# Patient Record
Sex: FEMALE | Race: WHITE | Employment: OTHER | URBAN - METROPOLITAN AREA
[De-identification: names, ages, dates, MRNs, and addresses within clinical notes are randomized per-mention and may not be internally consistent; named-entity substitution may affect disease eponyms.]

---

## 2019-01-23 ENCOUNTER — APPOINTMENT (OUTPATIENT)
Dept: GENERAL RADIOLOGY | Age: 84
End: 2019-01-23
Payer: MEDICARE

## 2019-01-23 ENCOUNTER — APPOINTMENT (OUTPATIENT)
Dept: CT IMAGING | Age: 84
End: 2019-01-23
Payer: MEDICARE

## 2019-01-23 ENCOUNTER — HOSPITAL ENCOUNTER (EMERGENCY)
Age: 84
Discharge: HOME | End: 2019-01-23
Attending: EMERGENCY MEDICINE
Payer: MEDICARE

## 2019-01-23 VITALS
HEART RATE: 67 BPM | DIASTOLIC BLOOD PRESSURE: 84 MMHG | OXYGEN SATURATION: 95 % | TEMPERATURE: 98.4 F | RESPIRATION RATE: 16 BRPM | SYSTOLIC BLOOD PRESSURE: 190 MMHG
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
## 2019-01-23 LAB
ANION GAP SERPL CALCULATED.3IONS-SCNC: 12 MMOL/L (ref 3–16)
BASOPHILS ABSOLUTE: 0.1 K/UL (ref 0–0.2)
BASOPHILS RELATIVE PERCENT: 1.3 %
BILIRUBIN URINE: NEGATIVE
BLOOD, URINE: NEGATIVE
BUN BLDV-MCNC: 24 MG/DL (ref 7–20)
CALCIUM SERPL-MCNC: 9.6 MG/DL (ref 8.3–10.6)
CHLORIDE BLD-SCNC: 98 MMOL/L (ref 99–110)
CLARITY: CLEAR
CO2: 26 MMOL/L (ref 21–32)
COLOR: YELLOW
CREAT SERPL-MCNC: 1.1 MG/DL (ref 0.6–1.2)
EKG ATRIAL RATE: 67 BPM
EKG DIAGNOSIS: NORMAL
EKG P AXIS: 45 DEGREES
EKG P-R INTERVAL: 196 MS
EKG Q-T INTERVAL: 432 MS
EKG QRS DURATION: 90 MS
EKG QTC CALCULATION (BAZETT): 456 MS
EKG R AXIS: -62 DEGREES
EKG T AXIS: 55 DEGREES
EKG VENTRICULAR RATE: 67 BPM
EOSINOPHILS ABSOLUTE: 0.3 K/UL (ref 0–0.6)
EOSINOPHILS RELATIVE PERCENT: 2.7 %
GFR AFRICAN AMERICAN: 56
GFR NON-AFRICAN AMERICAN: 47
GLUCOSE BLD-MCNC: 105 MG/DL (ref 70–99)
GLUCOSE URINE: NEGATIVE MG/DL
HCT VFR BLD CALC: 35 % (ref 36–48)
HEMOGLOBIN: 11.5 G/DL (ref 12–16)
KETONES, URINE: NEGATIVE MG/DL
LEUKOCYTE ESTERASE, URINE: NEGATIVE
LYMPHOCYTES ABSOLUTE: 2.3 K/UL (ref 1–5.1)
LYMPHOCYTES RELATIVE PERCENT: 23.5 %
MCH RBC QN AUTO: 28.7 PG (ref 26–34)
MCHC RBC AUTO-ENTMCNC: 32.7 G/DL (ref 31–36)
MCV RBC AUTO: 87.8 FL (ref 80–100)
MICROSCOPIC EXAMINATION: NORMAL
MONOCYTES ABSOLUTE: 1.2 K/UL (ref 0–1.3)
MONOCYTES RELATIVE PERCENT: 12.6 %
NEUTROPHILS ABSOLUTE: 5.8 K/UL (ref 1.7–7.7)
NEUTROPHILS RELATIVE PERCENT: 59.9 %
NITRITE, URINE: NEGATIVE
PDW BLD-RTO: 14.1 % (ref 12.4–15.4)
PH UA: 5.5
PLATELET # BLD: 214 K/UL (ref 135–450)
PMV BLD AUTO: 8.5 FL (ref 5–10.5)
POTASSIUM SERPL-SCNC: 3.5 MMOL/L (ref 3.5–5.1)
PROTEIN UA: NEGATIVE MG/DL
RBC # BLD: 3.99 M/UL (ref 4–5.2)
SODIUM BLD-SCNC: 136 MMOL/L (ref 136–145)
SPECIFIC GRAVITY UA: 1.02
TROPONIN: <0.01 NG/ML
URINE TYPE: NORMAL
UROBILINOGEN, URINE: 0.2 E.U./DL
WBC # BLD: 9.6 K/UL (ref 4–11)

## 2019-01-23 PROCEDURE — 85025 COMPLETE CBC W/AUTO DIFF WBC: CPT

## 2019-01-23 PROCEDURE — 84484 ASSAY OF TROPONIN QUANT: CPT

## 2019-01-23 PROCEDURE — 93005 ELECTROCARDIOGRAM TRACING: CPT | Performed by: EMERGENCY MEDICINE

## 2019-01-23 PROCEDURE — 36415 COLL VENOUS BLD VENIPUNCTURE: CPT

## 2019-01-23 PROCEDURE — 70450 CT HEAD/BRAIN W/O DYE: CPT

## 2019-01-23 PROCEDURE — 72125 CT NECK SPINE W/O DYE: CPT

## 2019-01-23 PROCEDURE — 96360 HYDRATION IV INFUSION INIT: CPT

## 2019-01-23 PROCEDURE — 99285 EMERGENCY DEPT VISIT HI MDM: CPT

## 2019-01-23 PROCEDURE — 73502 X-RAY EXAM HIP UNI 2-3 VIEWS: CPT

## 2019-01-23 PROCEDURE — 80048 BASIC METABOLIC PNL TOTAL CA: CPT

## 2019-01-23 PROCEDURE — 81003 URINALYSIS AUTO W/O SCOPE: CPT

## 2019-01-23 PROCEDURE — 71045 X-RAY EXAM CHEST 1 VIEW: CPT

## 2019-01-23 PROCEDURE — 2580000003 HC RX 258: Performed by: EMERGENCY MEDICINE

## 2019-01-23 RX ORDER — 0.9 % SODIUM CHLORIDE 0.9 %
500 INTRAVENOUS SOLUTION INTRAVENOUS ONCE
Status: COMPLETED | OUTPATIENT
Start: 2019-01-23 | End: 2019-01-23

## 2019-01-23 RX ADMIN — SODIUM CHLORIDE 500 ML: 9 INJECTION, SOLUTION INTRAVENOUS at 02:38

## 2019-02-12 ENCOUNTER — HOSPITAL ENCOUNTER (EMERGENCY)
Age: 84
Discharge: HOME | End: 2019-02-12
Attending: EMERGENCY MEDICINE
Payer: MEDICARE

## 2019-02-12 ENCOUNTER — APPOINTMENT (OUTPATIENT)
Dept: CT IMAGING | Age: 84
End: 2019-02-12
Payer: MEDICARE

## 2019-02-12 VITALS
TEMPERATURE: 98.2 F | RESPIRATION RATE: 12 BRPM | DIASTOLIC BLOOD PRESSURE: 80 MMHG | HEART RATE: 54 BPM | SYSTOLIC BLOOD PRESSURE: 200 MMHG | WEIGHT: 141 LBS | OXYGEN SATURATION: 94 %
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
## 2019-02-12 DIAGNOSIS — S00.83XA CONTUSION OF FACE, INITIAL ENCOUNTER: ICD-10-CM

## 2019-02-12 DIAGNOSIS — S09.90XA INJURY OF HEAD, INITIAL ENCOUNTER: ICD-10-CM

## 2019-02-12 PROCEDURE — 70450 CT HEAD/BRAIN W/O DYE: CPT

## 2019-02-12 PROCEDURE — 99284 EMERGENCY DEPT VISIT MOD MDM: CPT

## 2019-02-12 PROCEDURE — 72125 CT NECK SPINE W/O DYE: CPT

## 2019-02-12 RX ORDER — ACETAMINOPHEN 650 MG/1
650 SUPPOSITORY RECTAL EVERY 6 HOURS PRN
COMMUNITY
End: 2019-05-30 | Stop reason: CLARIF

## 2019-02-12 RX ORDER — POTASSIUM CHLORIDE 750 MG/1
10 TABLET, EXTENDED RELEASE ORAL DAILY
COMMUNITY
End: 2019-05-30 | Stop reason: CLARIF

## 2019-02-12 RX ORDER — DONEPEZIL HYDROCHLORIDE 10 MG/1
10 TABLET, FILM COATED ORAL
COMMUNITY

## 2019-02-12 RX ORDER — B-COMPLEX WITH VITAMIN C
1 TABLET ORAL DAILY
COMMUNITY
End: 2019-05-30 | Stop reason: CLARIF

## 2019-02-12 RX ORDER — ESCITALOPRAM OXALATE 10 MG/1
10 TABLET ORAL DAILY
COMMUNITY

## 2019-02-12 RX ORDER — METOPROLOL SUCCINATE 25 MG/1
25 TABLET, EXTENDED RELEASE ORAL DAILY
COMMUNITY

## 2019-02-12 RX ORDER — IBUPROFEN 400 MG/1
400 TABLET ORAL 2 TIMES DAILY WITH MEALS
COMMUNITY

## 2019-02-12 RX ORDER — ASPIRIN 81 MG/1
81 TABLET, CHEWABLE ORAL DAILY
COMMUNITY

## 2019-02-12 RX ORDER — SENNA PLUS 8.6 MG/1
1 TABLET ORAL DAILY
COMMUNITY

## 2019-02-12 RX ORDER — LOVASTATIN 20 MG/1
20 TABLET ORAL
COMMUNITY
End: 2019-05-30 | Stop reason: CLARIF

## 2019-02-12 RX ORDER — OMEPRAZOLE 20 MG/1
20 CAPSULE, DELAYED RELEASE ORAL DAILY
COMMUNITY

## 2019-02-12 ASSESSMENT — ENCOUNTER SYMPTOMS
BACK PAIN: 0
SHORTNESS OF BREATH: 0
ABDOMINAL PAIN: 0
FACIAL SWELLING: 1

## 2019-02-12 ASSESSMENT — PAIN DESCRIPTION - LOCATION: LOCATION: EYE

## 2019-02-12 ASSESSMENT — PAIN SCALES - WONG BAKER: WONGBAKER_NUMERICALRESPONSE: 2

## 2019-02-12 ASSESSMENT — PAIN DESCRIPTION - ORIENTATION: ORIENTATION: LEFT

## 2019-05-30 ENCOUNTER — APPOINTMENT (OUTPATIENT)
Dept: CT IMAGING | Age: 84
End: 2019-05-30
Payer: MEDICARE

## 2019-05-30 ENCOUNTER — APPOINTMENT (OUTPATIENT)
Dept: GENERAL RADIOLOGY | Age: 84
End: 2019-05-30
Payer: MEDICARE

## 2019-05-30 ENCOUNTER — HOSPITAL ENCOUNTER (EMERGENCY)
Age: 84
Discharge: HOME | End: 2019-05-30
Attending: EMERGENCY MEDICINE
Payer: MEDICARE

## 2019-05-30 VITALS
SYSTOLIC BLOOD PRESSURE: 196 MMHG | DIASTOLIC BLOOD PRESSURE: 94 MMHG | TEMPERATURE: 97.6 F | WEIGHT: 144 LBS | HEIGHT: 63 IN | HEART RATE: 67 BPM | BODY MASS INDEX: 25.52 KG/M2 | RESPIRATION RATE: 23 BRPM | OXYGEN SATURATION: 98 %

## 2019-05-30 DIAGNOSIS — I63.512 ACUTE ISCHEMIC LEFT MCA STROKE (HCC): Primary | ICD-10-CM

## 2019-05-30 LAB
ANION GAP SERPL CALCULATED.3IONS-SCNC: 13 MMOL/L (ref 3–16)
APTT: 22.6 SEC (ref 26–36)
BASE EXCESS VENOUS: 0 (ref -3–3)
BASOPHILS ABSOLUTE: 0 K/UL (ref 0–0.2)
BASOPHILS RELATIVE PERCENT: 0.5 %
BILIRUBIN URINE: NEGATIVE
BLOOD, URINE: NEGATIVE
BUN BLDV-MCNC: 24 MG/DL (ref 7–20)
CALCIUM IONIZED: 1.3 MMOL/L (ref 1.12–1.32)
CALCIUM SERPL-MCNC: 9.6 MG/DL (ref 8.3–10.6)
CHLORIDE BLD-SCNC: 101 MMOL/L (ref 99–110)
CLARITY: CLEAR
CO2: 24 MMOL/L (ref 21–32)
CO2: 27 MMOL/L (ref 21–32)
COLOR: YELLOW
CREAT SERPL-MCNC: 1 MG/DL (ref 0.6–1.2)
EKG ATRIAL RATE: 68 BPM
EKG DIAGNOSIS: NORMAL
EKG P AXIS: 95 DEGREES
EKG P-R INTERVAL: 204 MS
EKG Q-T INTERVAL: 442 MS
EKG QRS DURATION: 100 MS
EKG QTC CALCULATION (BAZETT): 469 MS
EKG R AXIS: -52 DEGREES
EKG T AXIS: 22 DEGREES
EKG VENTRICULAR RATE: 68 BPM
EOSINOPHILS ABSOLUTE: 0.3 K/UL (ref 0–0.6)
EOSINOPHILS RELATIVE PERCENT: 2.8 %
GFR AFRICAN AMERICAN: 56
GFR AFRICAN AMERICAN: >60
GFR NON-AFRICAN AMERICAN: 47
GFR NON-AFRICAN AMERICAN: 52
GLUCOSE BLD-MCNC: 109 MG/DL (ref 70–99)
GLUCOSE BLD-MCNC: 111 MG/DL (ref 70–99)
GLUCOSE BLD-MCNC: 112 MG/DL (ref 70–99)
GLUCOSE URINE: NEGATIVE MG/DL
HCO3 VENOUS: 25.5 MMOL/L (ref 23–29)
HCT VFR BLD CALC: 32.8 % (ref 36–48)
HEMOGLOBIN: 10.8 G/DL (ref 12–16)
INR BLD: 1.1 (ref 0.86–1.14)
KETONES, URINE: NEGATIVE MG/DL
LACTATE: 1.14 MMOL/L (ref 0.4–2)
LEUKOCYTE ESTERASE, URINE: NEGATIVE
LYMPHOCYTES ABSOLUTE: 1.5 K/UL (ref 1–5.1)
LYMPHOCYTES RELATIVE PERCENT: 16.8 %
MCH RBC QN AUTO: 28.3 PG (ref 26–34)
MCHC RBC AUTO-ENTMCNC: 33 G/DL (ref 31–36)
MCV RBC AUTO: 85.6 FL (ref 80–100)
MICROSCOPIC EXAMINATION: NORMAL
MONOCYTES ABSOLUTE: 0.9 K/UL (ref 0–1.3)
MONOCYTES RELATIVE PERCENT: 10.5 %
NEUTROPHILS ABSOLUTE: 6.1 K/UL (ref 1.7–7.7)
NEUTROPHILS RELATIVE PERCENT: 69.4 %
NITRITE, URINE: NEGATIVE
O2 SAT, VEN: 67 %
PCO2, VEN: 42.8 MM HG (ref 40–50)
PDW BLD-RTO: 15 % (ref 12.4–15.4)
PERFORMED ON: ABNORMAL
PERFORMED ON: ABNORMAL
PERFORMED ON: NORMAL
PERFORMED ON: NORMAL
PH UA: 6 (ref 5–8)
PH VENOUS: 7.38 (ref 7.35–7.45)
PLATELET # BLD: 256 K/UL (ref 135–450)
PMV BLD AUTO: 8.6 FL (ref 5–10.5)
PO2, VEN: 36 MM HG
POC ANION GAP: 8 (ref 10–20)
POC BUN: 23 MG/DL (ref 7–18)
POC CHLORIDE: 103 MMOL/L (ref 99–110)
POC CREATININE: 1.1 MG/DL (ref 0.6–1.2)
POC POTASSIUM: 3.7 MMOL/L (ref 3.5–5.1)
POC SAMPLE TYPE: ABNORMAL
POC SAMPLE TYPE: NORMAL
POC SAMPLE TYPE: NORMAL
POC SODIUM: 138 MMOL/L (ref 136–145)
POC TROPONIN I: 0.01 NG/ML (ref 0–0.1)
POTASSIUM REFLEX MAGNESIUM: 4.1 MMOL/L (ref 3.5–5.1)
PROTEIN UA: NEGATIVE MG/DL
PROTHROMBIN TIME: 12.5 SEC (ref 9.8–13)
RBC # BLD: 3.83 M/UL (ref 4–5.2)
SODIUM BLD-SCNC: 138 MMOL/L (ref 136–145)
SPECIFIC GRAVITY UA: 1.01 (ref 1–1.03)
TCO2 CALC VENOUS: 27 MMOL/L
TROPONIN: <0.01 NG/ML
URINE REFLEX TO CULTURE: NORMAL
URINE TYPE: NORMAL
UROBILINOGEN, URINE: 0.2 E.U./DL
WBC # BLD: 8.9 K/UL (ref 4–11)

## 2019-05-30 PROCEDURE — 82803 BLOOD GASES ANY COMBINATION: CPT

## 2019-05-30 PROCEDURE — 83605 ASSAY OF LACTIC ACID: CPT

## 2019-05-30 PROCEDURE — 70450 CT HEAD/BRAIN W/O DYE: CPT

## 2019-05-30 PROCEDURE — 70496 CT ANGIOGRAPHY HEAD: CPT

## 2019-05-30 PROCEDURE — 96374 THER/PROPH/DIAG INJ IV PUSH: CPT

## 2019-05-30 PROCEDURE — 6360000004 HC RX CONTRAST MEDICATION: Performed by: EMERGENCY MEDICINE

## 2019-05-30 PROCEDURE — 71045 X-RAY EXAM CHEST 1 VIEW: CPT

## 2019-05-30 PROCEDURE — 93005 ELECTROCARDIOGRAM TRACING: CPT | Performed by: EMERGENCY MEDICINE

## 2019-05-30 PROCEDURE — 85025 COMPLETE CBC W/AUTO DIFF WBC: CPT

## 2019-05-30 PROCEDURE — 81003 URINALYSIS AUTO W/O SCOPE: CPT

## 2019-05-30 PROCEDURE — 70498 CT ANGIOGRAPHY NECK: CPT

## 2019-05-30 PROCEDURE — 6370000000 HC RX 637 (ALT 250 FOR IP): Performed by: EMERGENCY MEDICINE

## 2019-05-30 PROCEDURE — 84484 ASSAY OF TROPONIN QUANT: CPT

## 2019-05-30 PROCEDURE — 80047 BASIC METABLC PNL IONIZED CA: CPT

## 2019-05-30 PROCEDURE — 6360000002 HC RX W HCPCS: Performed by: EMERGENCY MEDICINE

## 2019-05-30 PROCEDURE — 85730 THROMBOPLASTIN TIME PARTIAL: CPT

## 2019-05-30 PROCEDURE — 99285 EMERGENCY DEPT VISIT HI MDM: CPT

## 2019-05-30 PROCEDURE — 85610 PROTHROMBIN TIME: CPT

## 2019-05-30 RX ORDER — AMLODIPINE BESYLATE 5 MG/1
5 TABLET ORAL DAILY
COMMUNITY

## 2019-05-30 RX ORDER — ACETAMINOPHEN 650 MG/1
650 SUPPOSITORY RECTAL EVERY 6 HOURS PRN
COMMUNITY

## 2019-05-30 RX ORDER — ATORVASTATIN CALCIUM 10 MG/1
10 TABLET, FILM COATED ORAL DAILY
Qty: 30 TABLET | Refills: 5 | DISCHARGE
Start: 2019-05-30

## 2019-05-30 RX ORDER — ATORVASTATIN CALCIUM 10 MG/1
10 TABLET, FILM COATED ORAL DAILY
Qty: 90 TABLET | Refills: 0 | Status: SHIPPED | OUTPATIENT
Start: 2019-05-30

## 2019-05-30 RX ORDER — ACETAMINOPHEN 325 MG/1
650 TABLET ORAL ONCE
Status: DISCONTINUED | OUTPATIENT
Start: 2019-05-30 | End: 2019-05-30 | Stop reason: HOSPADM

## 2019-05-30 RX ORDER — ACETAMINOPHEN 325 MG/1
650 TABLET ORAL EVERY 6 HOURS PRN
COMMUNITY

## 2019-05-30 RX ORDER — KETOROLAC TROMETHAMINE 30 MG/ML
15 INJECTION, SOLUTION INTRAMUSCULAR; INTRAVENOUS ONCE
Status: COMPLETED | OUTPATIENT
Start: 2019-05-30 | End: 2019-05-30

## 2019-05-30 RX ORDER — SIMVASTATIN 20 MG
20 TABLET ORAL NIGHTLY
Qty: 30 TABLET | Refills: 5 | DISCHARGE
Start: 2019-05-30 | End: 2019-05-30 | Stop reason: HOSPADM

## 2019-05-30 RX ORDER — RISPERIDONE 0.25 MG/1
0.25 TABLET, FILM COATED ORAL NIGHTLY
COMMUNITY

## 2019-05-30 RX ORDER — HYDROCORTISONE 0.5 %
CREAM (GRAM) TOPICAL 3 TIMES DAILY PRN
COMMUNITY

## 2019-05-30 RX ADMIN — KETOROLAC TROMETHAMINE 15 MG: 30 INJECTION, SOLUTION INTRAMUSCULAR at 14:11

## 2019-05-30 RX ADMIN — IOPAMIDOL 80 ML: 755 INJECTION, SOLUTION INTRAVENOUS at 11:39

## 2019-05-30 ASSESSMENT — PAIN SCALES - GENERAL
PAINLEVEL_OUTOF10: 6
PAINLEVEL_OUTOF10: 6

## 2019-05-30 NOTE — ED PROVIDER NOTES
[address]          EM RESIDENT NOTE       Date of evaluation: 5/30/2019    Chief Complaint     AMS    History of Present Illness     Sonu Pacheco is a 80 y.o. female who presents with concern for stroke. The patient's history is limited given her underlying dementia. She was last seen normal at approximately 8:30 this morning when she was found by a LPN to have right-sided weakness with some mild dysarthria. EMS was called. They noted her to be normal glycemic with very mild right-sided deficits. She does not have any reported history of stroke and is not currently on any blood thinning medications. Review of Systems     Review of Systems   Unable to perform ROS: Dementia       Past Medical, Surgical, Family, and Social History     She has a past medical history of Dementia, GERD (gastroesophageal reflux disease), Gout, Hyperlipidemia, and Hypertension. She has no past surgical history on file. Her family history is not on file. She reports that she has never smoked. She has never used smokeless tobacco. She reports that she does not drink alcohol or use drugs.     Medications     Previous Medications    ACETAMINOPHEN (TYLENOL) 325 MG TABLET    Take 650 mg by mouth every 6 hours as needed for Pain    ACETAMINOPHEN (TYLENOL) 650 MG SUPPOSITORY    Place 650 mg rectally every 6 hours as needed for Fever    AMLODIPINE (NORVASC) 5 MG TABLET    Take 5 mg by mouth daily    ASPIRIN 81 MG CHEWABLE TABLET    Take 81 mg by mouth daily    DONEPEZIL (ARICEPT) 10 MG TABLET    Take 10 mg by mouth Daily with supper    ESCITALOPRAM (LEXAPRO) 10 MG TABLET    Take 10 mg by mouth daily    IBUPROFEN (ADVIL;MOTRIN) 400 MG TABLET    Take 400 mg by mouth 2 times daily (with meals)    METHYL SALICYLATE-MENTHOL (SHUN BRIDGES GREASELESS) 10-15 % CREA    Apply topically 3 times daily as needed for Pain    METOPROLOL SUCCINATE (TOPROL XL) 25 MG EXTENDED RELEASE TABLET    Take 25 mg by mouth daily    OMEPRAZOLE (PRILOSEC) 20 MG DELAYED RELEASE CAPSULE    Take 20 mg by mouth daily    RISPERIDONE (RISPERDAL) 0.25 MG TABLET    Take 0.25 mg by mouth nightly    SENNA (SENOKOT) 8.6 MG TABLET    Take 1 tablet by mouth daily       Allergies     She is allergic to codeine. Physical Exam     INITIAL VITALS: BP: (!) 157/72, Temp: 97.6 °F (36.4 °C), Pulse: 62, Resp: 16, SpO2: 95 %   Physical Exam   Constitutional: She appears well-developed and well-nourished. No distress. Elderly female   HENT:   Head: Normocephalic and atraumatic. Eyes: Conjunctivae and EOM are normal.   Cardiovascular: Normal rate, regular rhythm, normal heart sounds and intact distal pulses. Exam reveals no gallop and no friction rub. No murmur heard. Pulmonary/Chest: Effort normal and breath sounds normal. No respiratory distress. She has no wheezes. She has no rales. Abdominal: Soft. Bowel sounds are normal. She exhibits no distension. There is no tenderness. There is no rebound and no guarding. Neurological: She is alert. Confused, baseline per family  Mild R facial droop  Mild RUE weakness  Mild dysarthria   Skin: Skin is warm and dry. Nursing note and vitals reviewed. NIH Stroke Scale      Interval: Baseline  Time: 12:52 PM  Person Administering Scale: Charlett Phalen      1a  Level of consciousness: 0=alert; keenly responsive   1b. LOC questions:  1=Performs one task correctly   1c. LOC commands: 0=Performs both tasks correctly   2. Best Gaze: 0=normal   3. Visual: 0=No visual loss   4. Facial Palsy: 1=Minor paralysis (flattened nasolabial fold, asymmetric on smiling)   5a. Motor left arm: 0=No drift, limb holds 90 (or 45) degrees for full 10 seconds   5b.   Motor right arm: 1=Drift, limb holds 90 (or 45) degrees but drifts down before full 10 seconds: does not hit bed   6a. motor left le=No drift, limb holds 90 (or 45) degrees for full 10 seconds   6b  Motor right le=No drift, limb holds 90 (or 45) degrees for full 10 seconds   7. Limb Ataxia: 0=Absent   8. Sensory: 0=Normal; no sensory loss   9. Best Language:  0=No aphasia, normal   10. Dysarthria: 1=Mild to moderate, patient slurs at least some words and at worst, can be understood with some difficulty   11. Extinction and Inattention: 0=No abnormality   12. Distal motor function: 0=Normal    Total:   4     DiagnosticResults     EKG   Interpreted in conjunction with emergencydepartment physician No att. providers found  Rhythm: normal sinus   Rate: normal  Axis: left  Ectopy: premature atrial contraction  Conduction: 1st degree AV block  ST Segments: no acute change  T Waves:no acute change  Q Waves: none  Clinical Impression: non-specific EKG  Comparison:  No changes compared to EKG obtained 1/23/19    RADIOLOGY:  XR CHEST PORTABLE   Final Result   1. No evidence of acute cardiopulmonary disease. CTA NECK W CONTRAST   Final Result      1. Normal variant anatomy, as above. No large vessel occlusion or significant stenosis. CTA HEAD W CONTRAST   Final Result      1. Normal variant anatomy, as above. No large vessel occlusion or significant stenosis. CT Head WO Contrast   Final Result      No acute intracranial abnormality. Large remote infarct of the left occipital lobe. Generalized atrophy and age-related microvascular ischemic changes.           LABS:   Results for orders placed or performed during the hospital encounter of 05/30/19   CBC Auto Differential   Result Value Ref Range    WBC 8.9 4.0 - 11.0 K/uL    RBC 3.83 (L) 4.00 - 5.20 M/uL    Hemoglobin 10.8 (L) 12.0 - 16.0 g/dL    Hematocrit 32.8 (L) 36.0 - 48.0 %    MCV 85.6 80.0 - 100.0 fL    MCH 28.3 26.0 - 34.0 pg    MCHC 33.0 31.0 - 36.0 g/dL    RDW 15.0 12.4 - 15.4 %    Platelets 118 677 - 243 K/uL    MPV 8.6 5.0 - 10.5 fL    Neutrophils % 69.4 %    Lymphocytes % 16.8 %    Monocytes % 10.5 %    Eosinophils % 2.8 %    Basophils % 0.5 %    Neutrophils # 6.1 1.7 - 7.7 K/uL Lymphocytes # 1.5 1.0 - 5.1 K/uL    Monocytes # 0.9 0.0 - 1.3 K/uL    Eosinophils # 0.3 0.0 - 0.6 K/uL    Basophils # 0.0 0.0 - 0.2 K/uL   Basic Metabolic Panel w/ Reflex to MG   Result Value Ref Range    Sodium 138 136 - 145 mmol/L    Potassium reflex Magnesium 4.1 3.5 - 5.1 mmol/L    Chloride 101 99 - 110 mmol/L    CO2 24 21 - 32 mmol/L    Anion Gap 13 3 - 16    Glucose 112 (H) 70 - 99 mg/dL    BUN 24 (H) 7 - 20 mg/dL    CREATININE 1.0 0.6 - 1.2 mg/dL    GFR Non-African American 52 (A) >60    GFR African American >60 >60    Calcium 9.6 8.3 - 10.6 mg/dL   Troponin   Result Value Ref Range    Troponin <0.01 <0.01 ng/mL   Protime-INR   Result Value Ref Range    Protime 12.5 9.8 - 13.0 sec    INR 1.10 0.86 - 1.14   APTT   Result Value Ref Range    aPTT 22.6 (L) 26.0 - 36.0 sec   POCT Venous   Result Value Ref Range    pH, Petra 7.383 7.350 - 7.450    pCO2, Petra 42.8 40.0 - 50.0 mm Hg    pO2, Petra 36 Not Established mm Hg    HCO3, Venous 25.5 23.0 - 29.0 mmol/L    Base Excess, Petra 0 -3 - 3    O2 Sat, Petra 67 Not Established %    TC02 (Calc), Petra 27 Not Established mmol/L    Lactate 1.14 0.40 - 2.00 mmol/L    Sample Type PETRA     Performed on SEE BELOW    POCT Venous   Result Value Ref Range    POC Sodium 138 136 - 145 mmol/L    POC Potassium 3.7 3.5 - 5.1 mmol/L    POC Chloride 103 99 - 110 mmol/L    CO2 27 21 - 32 mmol/L    POC Anion Gap 8 (L) 10 - 20    POC Glucose 111 (H) 70 - 99 mg/dl    POC BUN 23 (H) 7 - 18 mg/dL    POC Creatinine 1.1 0.6 - 1.2 mg/dL    GFR Non- 47 (A) >60    GFR African American 56 (A)     Calcium, Ion 1.30 1.12 - 1.32 mmol/L    Sample Type PETRA     Performed on SEE BELOW    POCT Glucose   Result Value Ref Range    POC Glucose 109 (H) 70 - 99 mg/dl    Performed on ACCU-CHEK    POCT Venous   Result Value Ref Range    POC Troponin I 0.01 0.00 - 0.10 ng/mL    Sample Type PETRA     Performed on SEE BELOW          RECENT VITALS:  BP: (!) 187/78, Temp: 97.6 °F (36.4 °C), Pulse: 73,Resp: 20, neurology is not recommending any significant stroke workup. Additionally, there are just recommending physical therapy and occupational therapy however, the patient is able to have these things performed an outpatient basis at her current facility. She also Speech evaluation performed there. Additionally, her family is concerned that she may become delirious in the hospital and so they are requesting her to go home. We talked about the risks and benefits of this decision and they ultimately decided to pursue outpatient PT, OT, and speech therapy at the patient's current facility. They're given strict return precautions to return to the emergency department. This patient was also evaluated by the attending physician. All care plans werediscussed and agreed upon. Clinical Impression     1. Acute ischemic left MCA stroke (HCC)        Disposition     PATIENT REFERRED TO:  No follow-up provider specified.     DISCHARGE MEDICATIONS:  New Prescriptions    No medications on file       DISPOSITION       Tabitha Garcia MD  05/30/19 [address] MD Vadim  05/30/19 2022

## 2019-05-30 NOTE — CARE COORDINATION
Pt lives in an [place] [address] apartment at [facility]. She is in the emergency department today with c/o possible CVA. Information was retrieved from her paper chart. Emergency Contacts:  Alessandro Mora (daughter-in-law) Listed as FIRST CONTACT because she is easiest to reach [address] (son/HCPOA) [phone] H  [phone] Dmitry Oscar son/HCPOA [phone] C    Pt has a signed [facility] form.     Alexi Saenz RN  Case Management  [phone]

## 2019-05-30 NOTE — ED NOTES
Attempted to give patient tylenol. Pt unable to swallow. Pt made NPO and MD notified.   Pt will need a speech eval.     Mauri Broderick RN  05/30/19 3755

## 2019-05-30 NOTE — ED TRIAGE NOTES
Pt's LKN was 0830 this morning at nursing home. LPN was with pt and states the pt started to slur and concerned for a stroke. Pt arrived around 1110 and taken straight to CT scan. Pt has right side weakness and slurred speech at this time. Pt has hx of dementia.

## 2019-05-30 NOTE — ED NOTES
Bed: 1TR-01  Expected date: 5/30/19  Expected time:   Means of arrival:   Comments:  Medic 2-LKW 8743 R side drift, slurred speech, weakness R side     Ana Cristina Stringer RN  05/30/19 5129

## 2019-05-30 NOTE — DISCHARGE INSTR - COC
Continuity of Care Form    Patient Name: Sonu Pacheco   :  1928  MRN:  [id number]    Admit date:  2019  Discharge date:  ***    Code Status Order: No Order   Advance Directives:     Admitting Physician:  No admitting provider for patient encounter. PCP: No primary care provider on file. Discharging Nurse: [facility] Unit/Room#: TRTR  Discharging Unit Phone Number: ***    Emergency Contact:   Extended Emergency Contact Information  Primary Emergency Contact: Wally Savage of [address] [place] [address] Phone: [phone]  Mobile Phone: [phone]  Relation: Child  Secondary Emergency Contact: Elder Iqbal  Address: [facility] [address] Phone: [phone]  Relation: Child    Past Surgical History:  History reviewed. No pertinent surgical history. Immunization History: There is no immunization history on file for this patient. Active Problems: There is no problem list on file for this patient.       Isolation/Infection:   Isolation          No Isolation            Nurse Assessment:  Last Vital Signs: BP (!) 181/85   Pulse 63   Temp 97.6 °F (36.4 °C) (Oral)   Resp 13   Ht 5' 3\" (1.6 m)   Wt 144 lb (65.3 kg)   SpO2 100%   BMI 25.51 kg/m²     Last documented pain score (0-10 scale): Pain Level: 6  Last Weight:   Wt Readings from Last 1 Encounters:   19 144 lb (65.3 kg)     Mental Status:  {IP PT MENTAL STATUS:15400}    IV Access:  { RADHA IV ACCESS:733221889}    Nursing Mobility/ADLs:  Walking   {CHP DME IJGU:801029399}  Transfer  {CHP DME SGF}  Bathing  {CHP DME IIOY:439731360}  Dressing  {CHP DME ITRD:836428113}  Toileting  {CHP DME IITO:096823652}  Feeding  {CHP DME FDTH:633739806}  Med Admin  {CHP DME FJDW:452085446}  Med Delivery   { RADHA MED Delivery:297697879}    Wound Care Documentation and Therapy:        Elimination:  Continence:   · Bowel: {YES / MM:01002}  · Bladder: {YES / YQ:14112}  Urinary Catheter: {Urinary Catheter:165520136}   Colostomy/Ileostomy/Ileal Conduit: {YES / UQ:08883}       Date of Last BM: ***  No intake or output data in the 24 hours ending 19 1408  No intake/output data recorded. Safety Concerns:     [employer] Safety Concerns:344363817}    Impairments/Disabilities:      [employer] Impairments/Disabilities:149103266}    Nutrition Therapy:  Current Nutrition Therapy:   [employer] Diet List:294748888}    Routes of Feeding: {CHP DME Other Feedings:603406149}  Liquids: {Slp liquid thickness:26044}  Daily Fluid Restriction: {CHP DME Yes amt example:618622663}  Last Modified Barium Swallow with Video (Video Swallowing Test): {Done Not Done BUDV:975651309}    Treatments at the Time of Hospital Discharge:   Respiratory Treatments: ***  Oxygen Therapy:  {Therapy; copd oxygen:57578}  Ventilator:    { CC Vent KAPM:638098702}    Rehab Therapies: {THERAPEUTIC INTERVENTION:1253005506}  Weight Bearing Status/Restrictions: [facility] Weight Bearin}  Other Medical Equipment (for information only, NOT a DME order):  {EQUIPMENT:776302222}  Other Treatments: ***    Patient's personal belongings (please select all that are sent with patient):  {[facility] DME Belongings:310980950}    RN SIGNATURE:  {Esignature:786377010}    CASE MANAGEMENT/SOCIAL WORK SECTION    Inpatient Status Date: ***    Readmission Risk Assessment Score:  Readmission Risk              Risk of Unplanned Readmission:        0           Discharging to Facility/ Agency   · Name:   · Address:  · Phone:  · Fax:    Dialysis Facility (if applicable)   · Name:  · Address:  · Dialysis Schedule:  · Phone:  · Fax:    / signature: {Esignature:042335215}    PHYSICIAN SECTION    Prognosis: Poor    Condition at Discharge: Stable    Rehab Potential (if transferring to Rehab): Poor    Recommended Labs or Other Treatments After Discharge:     BP control; permissive hypertension for 48 hours.  Then goal systolic BP less than 459  PT, OT, SLP eval and treat  Aspirin, statin daily    Physician Certification: I certify the above information and transfer of Brookneal Cousin  is necessary for the continuing treatment of the diagnosis listed and that she requires Intermediate Nursing Care for greater 30 days.      Update Admission H&P: No change in H&P    PHYSICIAN SIGNATURE:  Electronically signed by Dwaine Keyes MD on 5/30/19 at 2:09 PM

## 2019-05-30 NOTE — CONSULTS
Hospital Medicine  Consult History & Physical        Chief Complaint:  Acute CVA    Date of Service: Pt seen/examined in consultation on 5/30/2019     History Of Present Illness:      80 y.o. female who we are asked to see/evaluate by Earl Diallo MD for possible admission to hospital for evaluation of acute CVA. Pt was noted to have facial droop and right sided weakness this morning around 8:30 AM, she was brought to the ED after that. She has a prior history of stroke and fairly advanced dementia, she has 24/7 nursing care in an assisted living facility at baseline. She was seen by neurology and recommended no further work up. TPA was advised against, due to pt being out of therapeutic window, advanced age and prior stroke. Physical and occupational therapy as well as speech therapy are recommended per neurology. Code status discussed with family, they report pt is a DNR, and they would not want aggressive measures if pt were to decline    Past Medical History:        Diagnosis Date    Dementia     GERD (gastroesophageal reflux disease)     Gout     Hyperlipidemia     Hypertension        Past Surgical History:    History reviewed. No pertinent surgical history. Medications Prior to Admission:    Prior to Admission medications    Medication Sig Start Date End Date Taking?  Authorizing Provider   acetaminophen (TYLENOL) 650 MG suppository Place 650 mg rectally every 6 hours as needed for Fever   Yes Historical Provider, MD   acetaminophen (TYLENOL) 325 MG tablet Take 650 mg by mouth every 6 hours as needed for Pain   Yes Historical Provider, MD   amLODIPine (NORVASC) 5 MG tablet Take 5 mg by mouth daily   Yes Historical Provider, MD   risperiDONE (RISPERDAL) 0.25 MG tablet Take 0.25 mg by mouth nightly   Yes Historical Provider, MD   methyl salicylate-menthol (SHUN BRIDGES GREASELESS) 10-15 % CREA Apply topically 3 times daily as needed for Pain   Yes Historical Provider, MD   atorvastatin (LIPITOR) 10 MG tablet Take 1 tablet by mouth daily 5/30/19  Yes Dwaine Salvage, MD   aspirin 81 MG chewable tablet Take 81 mg by mouth daily   Yes Historical Provider, MD   donepezil (ARICEPT) 10 MG tablet Take 10 mg by mouth Daily with supper   Yes Historical Provider, MD   escitalopram (LEXAPRO) 10 MG tablet Take 10 mg by mouth daily   Yes Historical Provider, MD   ibuprofen (ADVIL;MOTRIN) 400 MG tablet Take 400 mg by mouth 2 times daily (with meals)   Yes Historical Provider, MD   metoprolol succinate (TOPROL XL) 25 MG extended release tablet Take 25 mg by mouth daily   Yes Historical Provider, MD   omeprazole (PRILOSEC) 20 MG delayed release capsule Take 20 mg by mouth daily   Yes Historical Provider, MD   senna (SENOKOT) 8.6 MG tablet Take 1 tablet by mouth daily   Yes Historical Provider, MD       Allergies:  Codeine    Social History:      The patient currently lives in an assisted living facility. TOBACCO:   reports that she has never smoked. She has never used smokeless tobacco.  ETOH:   reports that she does not drink alcohol. Family History:       History reviewed. No pertinent family history. REVIEW OF SYSTEMS:   Pertinent positives as noted in the HPI. All other systems reviewed and negative. PHYSICAL EXAM PERFORMED:  BP (!) 186/91   Pulse 62   Temp 97.6 °F (36.4 °C) (Oral)   Resp 16   Ht 5' 3\" (1.6 m)   Wt 144 lb (65.3 kg)   SpO2 98%   BMI 25.51 kg/m²      General appearance: No apparent distress, appears stated age, pleasantly demented  HEENT: Normal cephalic, atraumatic without obvious deformity. Pupils equal, round, and reactive to light. Extra ocular muscles intact. Conjunctivae/corneas clear. Neck: Supple, with full range of motion. No jugular venous distention. Trachea midline. Respiratory:  Normal respiratory effort. Clear to auscultation, bilaterally without Rales/Wheezes/Rhonchi.   Cardiovascular: Regular rate and rhythm with normal S1/S2 without murmurs, rubs or members agree that further work up is unlikely to be beneficial. Goals of therapy after stroke include physical, occupational and speech therapy, daily aspirin and statin  She is at risk for delirium if hospitalized  Additionally, goals of care are more focused on comfort measures. Therefore I believe pt may be appropriate to return to her nursing facility as long as PT, OT and speech therapy can be arranged. I have ordered atorvastatin and low dose aspirin on discharge  Allow for permissive hypertension for 48 hours, allow bp to autoregulate.  Long term goal in light of her advanced age and focus on comfort should be less than 359 systolic    Discussed with ED resident, family and case management    Diet: Diet NPO Effective Now  Code Status: No Order    PT/OT Eval Status: Active and ongoing    Dispo - ok to discharge    Thank you for the consultation, please call with questions    Vic Goldberg MD

## 2019-05-30 NOTE — CARE COORDINATION
Pt can return to [facility] today if arrangements for PT/OT/SN can be established. Called the facility and spoke with her nurse, Venkat Cano. He stated the facility uses [facility]. Pt already set up to receive PT/OT. Since she was sent out today, she will need ROS orders. Updated Dr. Freddy Saavedra. Orders received. Updated [facility] and faxed orders to [phone]. Discussed with family as well.     Thierno Owusu RN  Case Management  [phone]

## 2019-05-30 NOTE — ED PROVIDER NOTES
ED Attending Attestation Note     Date of evaluation: 5/30/2019    This patient was seen by the resident. I have seen and examined the patient, agree with the workup, evaluation, management and diagnosis. The care plan has been discussed. I have reviewed the ECG and concur with the resident's interpretation. My assessment reveals elderly female with concern for new onset stroke at 0830. Arrival around 11:10am.  Directly to CT scanner. Exam with slight dysarthria and a very slight RUE drift. No aphasia. Addendum: 12:08pm- given the patient's history of significant dementia and ultimately being just outside the 3 hour window and 80years old wanted to speak with the family about the risks and benefits of alteplase. Also wanted speak with the stroke team who recommended that with an old stroke on head CT, mild nondisabling deficits, her age, and her greater than 3 hour time window, she is not a good candidate for alteplase at this time. The family and other providers are in agreement with this. She'll be admitted for further management    Critical Care:  Due to the immediate potential for life-threatening deterioration due to stroke, I spent 35 minutes providing critical care. This time is excluding time spent performing procedures.   oLco Longo III, MD  05/30/19 9674

## 2019-05-30 NOTE — ED NOTES
Report called to [facility], RN     Tonia Bertrand, [facility]  05/30/19 1800

## 2019-05-30 NOTE — CONSULTS
Neurology consult Note    Dr. Serena Ellis requesting this consult. CC: Stroke    HPI:   Mr. Lorna Escamilla is a 80year old woman with a PMH of dementia, gERD, HLD, HTN who presented from her nursing home with right facial droop, mild right arm weakness, and slurred speech. She arrived at the ED with a LTKW of four hours ago. The stroke team was notified and it was decided to forego TPA due to her deficits, her age, and an old stroke on CT. Past Medical History:   Diagnosis Date    Dementia     GERD (gastroesophageal reflux disease)     Gout     Hyperlipidemia     Hypertension      History reviewed. No pertinent surgical history. CURRENT MEDICATIONS:  No current facility-administered medications for this encounter.      Current Outpatient Medications:     acetaminophen (TYLENOL) 650 MG suppository, Place 650 mg rectally every 6 hours as needed for Fever, Disp: , Rfl:     acetaminophen (TYLENOL) 325 MG tablet, Take 650 mg by mouth every 6 hours as needed for Pain, Disp: , Rfl:     amLODIPine (NORVASC) 5 MG tablet, Take 5 mg by mouth daily, Disp: , Rfl:     risperiDONE (RISPERDAL) 0.25 MG tablet, Take 0.25 mg by mouth nightly, Disp: , Rfl:     methyl salicylate-menthol (SHUN BRIDGES GREASELESS) 10-15 % CREA, Apply topically 3 times daily as needed for Pain, Disp: , Rfl:     aspirin 81 MG chewable tablet, Take 81 mg by mouth daily, Disp: , Rfl:     donepezil (ARICEPT) 10 MG tablet, Take 10 mg by mouth Daily with supper, Disp: , Rfl:     escitalopram (LEXAPRO) 10 MG tablet, Take 10 mg by mouth daily, Disp: , Rfl:     ibuprofen (ADVIL;MOTRIN) 400 MG tablet, Take 400 mg by mouth 2 times daily (with meals), Disp: , Rfl:     metoprolol succinate (TOPROL XL) 25 MG extended release tablet, Take 25 mg by mouth daily, Disp: , Rfl:     omeprazole (PRILOSEC) 20 MG delayed release capsule, Take 20 mg by mouth daily, Disp: , Rfl:     senna (SENOKOT) 8.6 MG tablet, Take 1 tablet by mouth daily, Disp: , Rfl:       ROS: to presenting at the end of the time window, her age, and her mild deficits, TPA was decided against.  I spoke extensively to family and as aggressive workup won't really change her management, we decided against it.      Plan:  -Continue ASA 81mg PO daily, can consider increasing to 325mg PO daily   -Start atorvastatin 10mg PO nightly  -Bedside swallow before PO   -PT/OT/ST  -Wouldn't get MRI or echo at this time as they won't        JOSEPHINE Finley-CNP  Neurology  [phone]

## 2019-05-30 NOTE — ED NOTES
Home Med List is currently In Progress. Called [facility] and spoke with RN Kristi Lemons. He states that he did send a medication list and DNR copy with EMS, but we are unable to locate that paperwork now. He will refax now. Patient currently takes a daily ASA and Ibuprofen 400mg BID. Otherwise, no other blood thinners. She did receive all morning medications. Will update the medication list when fax arrives.       Home Med List is complete, per [facility] faxed list.  See paper chart.        Hans Wang PharmD., [facility]   5/30/2019 11:41 AM  Wireless: [phone]